# Patient Record
Sex: FEMALE | Race: WHITE | NOT HISPANIC OR LATINO | Employment: OTHER | URBAN - METROPOLITAN AREA
[De-identification: names, ages, dates, MRNs, and addresses within clinical notes are randomized per-mention and may not be internally consistent; named-entity substitution may affect disease eponyms.]

---

## 2020-09-19 ENCOUNTER — HOSPITAL ENCOUNTER (EMERGENCY)
Facility: HOSPITAL | Age: 85
Discharge: NURSING, REHAB OR LONG-TERM CARE | End: 2020-09-20
Attending: EMERGENCY MEDICINE | Admitting: NEUROLOGICAL SURGERY
Payer: MEDICARE

## 2020-09-19 DIAGNOSIS — M54.6 CHRONIC MIDLINE THORACIC BACK PAIN: ICD-10-CM

## 2020-09-19 DIAGNOSIS — M54.50 CHRONIC MIDLINE LOW BACK PAIN WITHOUT SCIATICA: ICD-10-CM

## 2020-09-19 DIAGNOSIS — S32.020A COMPRESSION FRACTURE OF L2 VERTEBRA, INITIAL ENCOUNTER: ICD-10-CM

## 2020-09-19 DIAGNOSIS — G89.29 CHRONIC MIDLINE THORACIC BACK PAIN: ICD-10-CM

## 2020-09-19 DIAGNOSIS — S09.90XA CLOSED HEAD INJURY: ICD-10-CM

## 2020-09-19 DIAGNOSIS — I60.9 SUBARACHNOID HEMORRHAGE: Primary | ICD-10-CM

## 2020-09-19 DIAGNOSIS — G89.29 CHRONIC MIDLINE LOW BACK PAIN WITHOUT SCIATICA: ICD-10-CM

## 2020-09-19 PROBLEM — S06.6X0A SUBARACHNOID HEMORRHAGE FOLLOWING INJURY, NO LOSS OF CONSCIOUSNESS: Status: ACTIVE | Noted: 2020-09-19

## 2020-09-19 LAB
ALBUMIN SERPL BCP-MCNC: 3.1 G/DL (ref 3.5–5.2)
ALP SERPL-CCNC: 71 U/L (ref 55–135)
ALT SERPL W/O P-5'-P-CCNC: 20 U/L (ref 10–44)
ANION GAP SERPL CALC-SCNC: 9 MMOL/L (ref 8–16)
APTT BLDCRRT: 24.7 SEC (ref 21–32)
AST SERPL-CCNC: 23 U/L (ref 10–40)
BASOPHILS # BLD AUTO: 0.06 K/UL (ref 0–0.2)
BASOPHILS NFR BLD: 0.5 % (ref 0–1.9)
BILIRUB SERPL-MCNC: 0.2 MG/DL (ref 0.1–1)
BUN SERPL-MCNC: 20 MG/DL (ref 10–30)
CALCIUM SERPL-MCNC: 8.7 MG/DL (ref 8.7–10.5)
CHLORIDE SERPL-SCNC: 104 MMOL/L (ref 95–110)
CO2 SERPL-SCNC: 28 MMOL/L (ref 23–29)
CREAT SERPL-MCNC: 0.8 MG/DL (ref 0.5–1.4)
DIFFERENTIAL METHOD: ABNORMAL
EOSINOPHIL # BLD AUTO: 0.8 K/UL (ref 0–0.5)
EOSINOPHIL NFR BLD: 6.3 % (ref 0–8)
ERYTHROCYTE [DISTWIDTH] IN BLOOD BY AUTOMATED COUNT: 13.9 % (ref 11.5–14.5)
EST. GFR  (AFRICAN AMERICAN): >60 ML/MIN/1.73 M^2
EST. GFR  (NON AFRICAN AMERICAN): >60 ML/MIN/1.73 M^2
GLUCOSE SERPL-MCNC: 71 MG/DL (ref 70–110)
HCT VFR BLD AUTO: 31.4 % (ref 37–48.5)
HGB BLD-MCNC: 9.9 G/DL (ref 12–16)
IMM GRANULOCYTES # BLD AUTO: 0.07 K/UL (ref 0–0.04)
IMM GRANULOCYTES NFR BLD AUTO: 0.6 % (ref 0–0.5)
INR PPP: 1 (ref 0.8–1.2)
LYMPHOCYTES # BLD AUTO: 1.6 K/UL (ref 1–4.8)
LYMPHOCYTES NFR BLD: 13.3 % (ref 18–48)
MCH RBC QN AUTO: 32.5 PG (ref 27–31)
MCHC RBC AUTO-ENTMCNC: 31.5 G/DL (ref 32–36)
MCV RBC AUTO: 103 FL (ref 82–98)
MONOCYTES # BLD AUTO: 0.7 K/UL (ref 0.3–1)
MONOCYTES NFR BLD: 5.6 % (ref 4–15)
NEUTROPHILS # BLD AUTO: 8.8 K/UL (ref 1.8–7.7)
NEUTROPHILS NFR BLD: 73.7 % (ref 38–73)
NRBC BLD-RTO: 0 /100 WBC
PLATELET # BLD AUTO: 260 K/UL (ref 150–350)
PMV BLD AUTO: 10.6 FL (ref 9.2–12.9)
POTASSIUM SERPL-SCNC: 4 MMOL/L (ref 3.5–5.1)
PROT SERPL-MCNC: 6.5 G/DL (ref 6–8.4)
PROTHROMBIN TIME: 10.6 SEC (ref 9–12.5)
RBC # BLD AUTO: 3.05 M/UL (ref 4–5.4)
SODIUM SERPL-SCNC: 141 MMOL/L (ref 136–145)
WBC # BLD AUTO: 11.96 K/UL (ref 3.9–12.7)

## 2020-09-19 PROCEDURE — 85730 THROMBOPLASTIN TIME PARTIAL: CPT

## 2020-09-19 PROCEDURE — 90471 IMMUNIZATION ADMIN: CPT | Performed by: EMERGENCY MEDICINE

## 2020-09-19 PROCEDURE — 99284 EMERGENCY DEPT VISIT MOD MDM: CPT | Mod: GC,,, | Performed by: NEUROLOGICAL SURGERY

## 2020-09-19 PROCEDURE — 63600175 PHARM REV CODE 636 W HCPCS: Performed by: EMERGENCY MEDICINE

## 2020-09-19 PROCEDURE — 12002 RPR S/N/AX/GEN/TRNK2.6-7.5CM: CPT

## 2020-09-19 PROCEDURE — 80053 COMPREHEN METABOLIC PANEL: CPT

## 2020-09-19 PROCEDURE — 99284 PR EMERGENCY DEPT VISIT,LEVEL IV: ICD-10-PCS | Mod: GC,,, | Performed by: NEUROLOGICAL SURGERY

## 2020-09-19 PROCEDURE — 85610 PROTHROMBIN TIME: CPT

## 2020-09-19 PROCEDURE — 99291 CRITICAL CARE FIRST HOUR: CPT | Mod: 25

## 2020-09-19 PROCEDURE — 90715 TDAP VACCINE 7 YRS/> IM: CPT | Performed by: EMERGENCY MEDICINE

## 2020-09-19 PROCEDURE — 85025 COMPLETE CBC W/AUTO DIFF WBC: CPT

## 2020-09-19 RX ADMIN — CLOSTRIDIUM TETANI TOXOID ANTIGEN (FORMALDEHYDE INACTIVATED), CORYNEBACTERIUM DIPHTHERIAE TOXOID ANTIGEN (FORMALDEHYDE INACTIVATED), BORDETELLA PERTUSSIS TOXOID ANTIGEN (GLUTARALDEHYDE INACTIVATED), BORDETELLA PERTUSSIS FILAMENTOUS HEMAGGLUTININ ANTIGEN (FORMALDEHYDE INACTIVATED), BORDETELLA PERTUSSIS PERTACTIN ANTIGEN, AND BORDETELLA PERTUSSIS FIMBRIAE 2/3 ANTIGEN 0.5 ML: 5; 2; 2.5; 5; 3; 5 INJECTION, SUSPENSION INTRAMUSCULAR at 11:09

## 2020-09-19 NOTE — ED NOTES
CT results showed Small foci of subarachnoid hemorrhage in the left frontal lobe as above.  No significant mass effect. Pt moved from room 15 into room 14 and hooked up to monitor. IVs x 2 placed. Pt still in C-collar. Dr Amaro at bedside for reevaluation.     Pt is currently awake and alert. Pt is forgetful at baseline.

## 2020-09-19 NOTE — ED NOTES
Dr Amaro at bedside stapling laceration on the back of head. 4 staples placed .  C-collar removed

## 2020-09-19 NOTE — ED PROVIDER NOTES
Encounter Date: 9/19/2020    SCRIBE #1 NOTE: I, Brenda Mariscal, am scribing for, and in the presence of,  Curly Amaro MD. I have scribed the following portions of the note - Other sections scribed: HPI, ROS.       History     Chief Complaint   Patient presents with    [place] [facility]     SAH after trip and fall , denies LOC. Lac to posterior head. Oriented to self only.      Cristina Taylor is a 89 year old female with PMHx of arthritis, bursitis, hyperlipidemia, hypercholesterolemia, thyroid disease, and glaucoma who presents to the ED via EMS for evaluation after a fall that occurred prior to arrival. Patent denies any loss of consciousness. Patient c/o neck pain but states it is due to her chronic back pain. Patient denies any hip injury or pain secondary to fall. Denies any new pain in back, arms, or legs secondary to fall. Denies fever, headache, eye problems, sore throat, chest pain, abdominal pain, shortness of breath, coughing, vomiting, diarrhea, or dysuria. PSHx includes cholecystectomy. Patient is allergic to aspirin, codeine, dicloxacillin, and Flexeril. No other associated symptoms.    The history is provided by the patient.     Review of patient's allergies indicates:   Allergen Reactions    Aspirin Hives    Codeine Hives    Dicloxacillin     Flexeril [cyclobenzaprine]      Past Medical History:   Diagnosis Date    Bursitis     Glaucoma     High cholesterol     Hyperlipemia     Thyroid disease      Past Surgical History:   Procedure Laterality Date    CHOLECYSTECTOMY       No family history on file.  Social History     Tobacco Use    Smoking status: Never Smoker    Smokeless tobacco: Never Used   Substance Use Topics    Alcohol use: Yes     Alcohol/week: 7.0 standard drinks     Types: 7 Glasses of wine per week    Drug use: No     Review of Systems   Constitutional: Negative for fever.   HENT: Negative for ear pain and sore throat.    Respiratory: Negative for cough and shortness of  breath.    Cardiovascular: Negative for chest pain.   Gastrointestinal: Negative for abdominal pain, diarrhea and vomiting.   Genitourinary: Negative for dysuria.   Musculoskeletal: Positive for neck pain. Negative for arthralgias and back pain.        Negative for bilateral arm or leg pain. Negative for hip pain.   Neurological: Negative for syncope and headaches.       Physical Exam     Initial Vitals [09/19/20 1542]   BP Pulse Resp Temp SpO2   (!) 97/51 68 18 97.7 °F (36.5 °C) 96 %      MAP       --         Physical Exam  The patient was examined specifically for the following:   General:No significant distress, Good color, Warm and dry. Head and neck:Scalp atraumatic, Neck supple. Neurological:Appropriate conversation, Gross motor deficits. Eyes:Conjugate gaze, Clear corneas. ENT: No epistaxis. Cardiac: Regular rate and rhythm, Grossly normal heart tones. Pulmonary: Wheezing, Rales. Gastrointestinal: Abdominal tenderness, Abdominal distention. Musculoskeletal: Extremity deformity, Apparent pain with range of motion of the joints. Skin: Rash.   The findings on examination were normal except for the following:  The patient has a scalp laceration on the right occipital scalp.  She is wearing a cervical collar.  The patient is awake alert and bright.  Cranial nerves, motor and sensory examination grossly unremarkable.  There is no significant pain with range of motion of the joints.  The patient is wearing a cervical collar.   ED Course   Critical Care    Date/Time: 9/19/2020 6:29 PM  Performed by: Curly Amaro MD  Authorized by: Curly Amaro MD   Total critical care time (exclusive of procedural time) : 0 minutes  Critical care time was exclusive of separately billable procedures and treating other patients and teaching time.  Critical care was necessary to treat or prevent imminent or life-threatening deterioration of the following conditions: CNS failure or compromise and trauma.  Critical care was time  spent personally by me on the following activities: development of treatment plan with patient or surrogate, evaluation of patient's response to treatment, examination of patient, obtaining history from patient or surrogate, ordering and performing treatments and interventions, ordering and review of laboratory studies, ordering and review of radiographic studies, pulse oximetry, re-evaluation of patient's condition and review of old charts.    Lac Repair    Date/Time: 9/19/2020 6:29 PM  Performed by: Curly Amaro MD  Authorized by: Curly Amaro MD   Body area: head/neck  Location details: scalp  Laceration length: 3 cm  Foreign bodies: no foreign bodies    Anesthesia:  Anesthetic total: 0 mL  Irrigation solution: saline  Amount of cleaning: standard  Debridement: none  Degree of undermining: none  Skin closure: staples  Approximation: close  Approximation difficulty: simple  Patient tolerance: Patient tolerated the procedure well with no immediate complications    Critical Care    Date/Time: 9/26/2020 9:24 AM  Performed by: Curly Amaro MD  Authorized by: Martin Contreras MD   Direct patient critical care time: 22 minutes  Additional history critical care time: 11 minutes  Ordering / reviewing critical care time: 11 minutes  Documentation critical care time: 11 minutes  Consulting other physicians critical care time: 4 minutes  Consult with family critical care time: 4 minutes  Total critical care time (exclusive of procedural time) : 63 minutes  Critical care time was exclusive of separately billable procedures and treating other patients and teaching time.  Critical care was necessary to treat or prevent imminent or life-threatening deterioration of the following conditions: CNS failure or compromise.  Critical care was time spent personally by me on the following activities: blood draw for specimens, development of treatment plan with patient or surrogate, discussions with consultants, evaluation of  patient's response to treatment, examination of patient, obtaining history from patient or surrogate, ordering and performing treatments and interventions, ordering and review of laboratory studies, ordering and review of radiographic studies, pulse oximetry, re-evaluation of patient's condition and review of old charts.        Labs Reviewed   COMPREHENSIVE METABOLIC PANEL - Abnormal; Notable for the following components:       Result Value    Albumin 3.1 (*)     All other components within normal limits   CBC W/ AUTO DIFFERENTIAL - Abnormal; Notable for the following components:    RBC 3.05 (*)     Hemoglobin 9.9 (*)     Hematocrit 31.4 (*)     Mean Corpuscular Volume 103 (*)     Mean Corpuscular Hemoglobin 32.5 (*)     Mean Corpuscular Hemoglobin Conc 31.5 (*)     Immature Granulocytes 0.6 (*)     Gran # (ANC) 8.8 (*)     Immature Grans (Abs) 0.07 (*)     Eos # 0.8 (*)     Gran% 73.7 (*)     Lymph% 13.3 (*)     All other components within normal limits   APTT   PROTIME-INR          Imaging Results          CT Head Without Contrast (Final result)  Result time 09/19/20 22:49:31    Final result by Isaias Daniel MD (09/19/20 22:49:31)                 Impression:      Stable small volume subarachnoid hemorrhage in the left frontal lobe.  No new or worsening intracranial hemorrhage.    Posterior scalp soft tissue injury.      Electronically signed by: Isaias Daniel MD  Date:    09/19/2020  Time:    22:49             Narrative:    EXAMINATION:  CT HEAD WITHOUT CONTRAST    CLINICAL HISTORY:  Stroke, follow up;    TECHNIQUE:  Low dose axial images were obtained through the head.  Coronal and sagittal reformations were also performed. Contrast was not administered.    COMPARISON:  CT head, 09/19/2020 at 16:31.    FINDINGS:  Generalized cerebral volume loss with ex vacuo dilation of the ventricles and sulci.  Moderate periventricular white matter hypoattenuation suggestive of chronic microvascular ischemic disease.   No evidence of acute territorial infarct.  There is stable trace subarachnoid hemorrhage in the left superolateral frontal lobe.  No new or worsening hemorrhage.  No significant mass effect.  No new midline shift.    No evidence of hydrocephalus.    Posterior scalp soft tissue injury with overlying skin staples.  No displaced calvarial fracture.    Visualized paranasal sinuses and mastoid air cells are clear.                               X-Ray Lumbar Spine Ap And Lateral (Final result)  Result time 09/19/20 19:12:39    Final result by Chago Keith MD (09/19/20 19:12:39)                 Impression:      Age-indeterminate compression fracture of the L2 vertebral body.  MRI may be attempted for further evaluation.    Chronic fracture of the L1 vertebral body with near planar appearance of L1 vertebral body.    Advanced degenerative changes in the lumbar spine.      Electronically signed by: Chago Keith MD  Date:    09/19/2020  Time:    19:12             Narrative:    EXAMINATION:  XR LUMBAR SPINE AP AND LATERAL    CLINICAL HISTORY:  Back pain or radiculopathy, trauma;    TECHNIQUE:  AP, lateral and spot images were performed of the lumbar spine.    COMPARISON:  01/13/2015 and 04/21/2016.    FINDINGS:  There is rightward curvature of the thoracolumbar alignment.  There is also straightening of normal lumbar lordosis.    There is a chronic fracture of the L1 vertebral body with near planar appearance of the L1 vertebral body.  There is an age-indeterminate fracture of the L2 vertebral body.  The remainder of the vertebral body heights are maintained.    There is significant hypertrophy of the posterior elements.  The transverse processes are intact.  There is intervertebral disc space narrowing throughout the lumbar spine.  There is joint space narrowing of the sacroiliac joints.    There are postoperative changes in the right upper abdominal quadrant.  There are advanced calcifications in the abdominal aorta.                                CT Head Without Contrast (Final result)  Result time 09/19/20 17:00:40    Final result by Chago Keith MD (09/19/20 17:00:40)                 Impression:      CT head:    Small foci of subarachnoid hemorrhage in the left frontal lobe as above.  No significant mass effect.    Subcutaneous soft tissue swelling in the parietal region.  No underlying calvarial fracture.    CT cervical spine.    Minimal anterolisthesis of C7 on T1.    No evidence of acute fracture of the cervical spine.    Case discussed with Dr. Amaro on 09/19/2020 at 16:59.      Electronically signed by: Chago Keith MD  Date:    09/19/2020  Time:    17:00             Narrative:    EXAMINATION:  CT HEAD WITHOUT CONTRAST; CT CERVICAL SPINE WITHOUT CONTRAST    CLINICAL HISTORY:  Head trauma, minor (Age => 65y);; Neck trauma (Age => 65y); Unspecified injury of head, initial encounter    TECHNIQUE:  Low dose axial images were obtained through the head and cervical spine.  Coronal and sagittal reformations were also performed. Contrast was not administered.    COMPARISON:  04/21/2016.    FINDINGS:  CT head:    There is subcutaneous soft tissue swelling in the high parietal regions.  The remaining subcutaneous tissues are unremarkable the bony calvarium is intact.  The paranasal sinuses are unremarkable.  The right mastoid air cells are clear.  There is trace left mastoid effusion.  There are postoperative changes in the orbits.    The craniocervical junction is within normal limits.  The midline structures are intact.  The ventricles and sulci are parenchyma suggestive of cerebral volume loss.  There are hypodensities within the periventricular and subcortical white matter.  The gray-white differentiation is maintained.  There are no extra-axial fluid collections.  There is small focus of subarachnoid blood in the left superior frontal gyrus.  Small additional foci of subarachnoid hemorrhage is identified within the left inferior  frontal sulcus.  There is no evidence of mass effect.    CT cervical spine:    There is minimal anterolisthesis of C7 on T1.  There is also leftward curvature of the cervical alignment.  The remainder of the cervical alignment is within normal limits    The vertebral body heights are maintained.  There is hypertrophy of the posterior elements.  The C1 ring is intact.  There is intervertebral disc space narrowing at multiple levels.  There is variable spinal canal and neural foraminal narrowing.  There is no evidence of acute fracture of the cervical spine.    There are calcifications of the neck vessels.  There is no evidence of lymphadenopathy in the neck.  The visualized lung apices are unremarkable.  There is no evidence of a pneumothorax or pulmonary contusion.                               CT Cervical Spine Without Contrast (Final result)  Result time 09/19/20 17:00:40    Final result by Chago Keith MD (09/19/20 17:00:40)                 Impression:      CT head:    Small foci of subarachnoid hemorrhage in the left frontal lobe as above.  No significant mass effect.    Subcutaneous soft tissue swelling in the parietal region.  No underlying calvarial fracture.    CT cervical spine.    Minimal anterolisthesis of C7 on T1.    No evidence of acute fracture of the cervical spine.    Case discussed with Dr. Amaro on 09/19/2020 at 16:59.      Electronically signed by: Chago Keith MD  Date:    09/19/2020  Time:    17:00             Narrative:    EXAMINATION:  CT HEAD WITHOUT CONTRAST; CT CERVICAL SPINE WITHOUT CONTRAST    CLINICAL HISTORY:  Head trauma, minor (Age => 65y);; Neck trauma (Age => 65y); Unspecified injury of head, initial encounter    TECHNIQUE:  Low dose axial images were obtained through the head and cervical spine.  Coronal and sagittal reformations were also performed. Contrast was not administered.    COMPARISON:  04/21/2016.    FINDINGS:  CT head:    There is subcutaneous soft tissue swelling in  the high parietal regions.  The remaining subcutaneous tissues are unremarkable the bony calvarium is intact.  The paranasal sinuses are unremarkable.  The right mastoid air cells are clear.  There is trace left mastoid effusion.  There are postoperative changes in the orbits.    The craniocervical junction is within normal limits.  The midline structures are intact.  The ventricles and sulci are parenchyma suggestive of cerebral volume loss.  There are hypodensities within the periventricular and subcortical white matter.  The gray-white differentiation is maintained.  There are no extra-axial fluid collections.  There is small focus of subarachnoid blood in the left superior frontal gyrus.  Small additional foci of subarachnoid hemorrhage is identified within the left inferior frontal sulcus.  There is no evidence of mass effect.    CT cervical spine:    There is minimal anterolisthesis of C7 on T1.  There is also leftward curvature of the cervical alignment.  The remainder of the cervical alignment is within normal limits    The vertebral body heights are maintained.  There is hypertrophy of the posterior elements.  The C1 ring is intact.  There is intervertebral disc space narrowing at multiple levels.  There is variable spinal canal and neural foraminal narrowing.  There is no evidence of acute fracture of the cervical spine.    There are calcifications of the neck vessels.  There is no evidence of lymphadenopathy in the neck.  The visualized lung apices are unremarkable.  There is no evidence of a pneumothorax or pulmonary contusion.                              Medical decision making:  Given the above this patient presents to the emergency room after a fall.  Patient hit her head in the phone presents with occipital scalp laceration was repair.  The patient is appropriate conversation.  She has no neck pain.  She reports that she has chronic thoracic back and lumbar pain that is not different since the fall.   She is without other injuries or problems.  CT of the head reveals a small subarachnoid hemorrhage.  The patient will be transferred to [facility] for further evaluation of her closed head injury.  Neurosurgery accepts the patient.   This patient also has a vertebral body compression fracture at L2.                 Scribe Attestation:   Scribe #1: I performed the above scribed service and the documentation accurately describes the services I performed. I attest to the accuracy of the note.                      Clinical Impression:     ICD-10-CM ICD-9-CM   1. Subarachnoid hemorrhage  I60.9 430   2. Closed head injury  S09.90XA 959.01   3. Chronic midline thoracic back pain  M54.6 724.1    G89.29 338.29   4. Chronic midline low back pain without sciatica  M54.5 724.2    G89.29 338.29   5. Compression fracture of L2 vertebra, initial encounter  S32.020A 805.4                          ED Disposition Condition    Transfer to Another Facility Stable           I personally performed the services described in this documentation.  All medical record  entries made by the scribe are at my direction and in my presence.  Signed, Dr. Sondra Amaro MD  09/22/20 0906       Curly Amaro MD  09/26/20 0925       Curly Amaro MD  09/28/20 1544       Curly Amaro MD  09/28/20 1605       Curly Amaro MD  09/28/20 1610       Curly Amaro MD  10/03/20 1123

## 2020-09-20 VITALS
DIASTOLIC BLOOD PRESSURE: 58 MMHG | RESPIRATION RATE: 19 BRPM | OXYGEN SATURATION: 96 % | TEMPERATURE: 98 F | HEART RATE: 88 BPM | HEIGHT: 60 IN | WEIGHT: 105 LBS | SYSTOLIC BLOOD PRESSURE: 111 MMHG | BODY MASS INDEX: 20.62 KG/M2

## 2020-09-20 NOTE — CARE UPDATE
Repeat CTH stable. No new hemorrhage. Ok for DC from NSGY perspective.     NSGY will sign off, please call w/questions.     Julianna León MD  Neurosurgery  [facility]

## 2020-09-20 NOTE — PROVIDER PROGRESS NOTES - EMERGENCY DEPT.
Encounter Date: 9/19/2020    ED Physician Progress Notes             On my exam, the patient is well appearing, I assumed care of this patient at change of shift from Dr. Contreras. Briefly, this is a 89 y.o. female who presented status post a fall.  She was diagnosed with a subarachnoid hemorrhage at an outside hospital and transferred here for neurosurgical evaluation.  She was seen and evaluated by Neurosurgery, and they recommended a repeat head CT given her clinical stability.  This has been done, and by my independent interpretation, it is clinically stable, so I think she can be discharged back to her nursing facility.  I discussed this with the patient's daughter, who is agreeable with plan, and the patient was transferred back to her nursing facility.    Workup Reviewed:     Labs Reviewed   COMPREHENSIVE METABOLIC PANEL - Abnormal; Notable for the following components:       Result Value    Albumin 3.1 (*)     All other components within normal limits   CBC W/ AUTO DIFFERENTIAL - Abnormal; Notable for the following components:    RBC 3.05 (*)     Hemoglobin 9.9 (*)     Hematocrit 31.4 (*)     Mean Corpuscular Volume 103 (*)     Mean Corpuscular Hemoglobin 32.5 (*)     Mean Corpuscular Hemoglobin Conc 31.5 (*)     Immature Granulocytes 0.6 (*)     Gran # (ANC) 8.8 (*)     Immature Grans (Abs) 0.07 (*)     Eos # 0.8 (*)     Gran% 73.7 (*)     Lymph% 13.3 (*)     All other components within normal limits   APTT   PROTIME-INR   SARS-COV-2 RDRP GENE     CT Head Without Contrast   Final Result      Stable small volume subarachnoid hemorrhage in the left frontal lobe.  No new or worsening intracranial hemorrhage.      Posterior scalp soft tissue injury.         Electronically signed by: Isaias Daniel MD   Date:    09/19/2020   Time:    22:49      X-Ray Lumbar Spine Ap And Lateral   Final Result      Age-indeterminate compression fracture of the L2 vertebral body.  MRI may be attempted for further evaluation.       Chronic fracture of the L1 vertebral body with near planar appearance of L1 vertebral body.      Advanced degenerative changes in the lumbar spine.         Electronically signed by: Chago Keith MD   Date:    09/19/2020   Time:    19:12      CT Head Without Contrast   Final Result      CT head:      Small foci of subarachnoid hemorrhage in the left frontal lobe as above.  No significant mass effect.      Subcutaneous soft tissue swelling in the parietal region.  No underlying calvarial fracture.      CT cervical spine.      Minimal anterolisthesis of C7 on T1.      No evidence of acute fracture of the cervical spine.      Case discussed with Dr. Amaro on 09/19/2020 at 16:59.         Electronically signed by: Chago Keith MD   Date:    09/19/2020   Time:    17:00      CT Cervical Spine Without Contrast   Final Result      CT head:      Small foci of subarachnoid hemorrhage in the left frontal lobe as above.  No significant mass effect.      Subcutaneous soft tissue swelling in the parietal region.  No underlying calvarial fracture.      CT cervical spine.      Minimal anterolisthesis of C7 on T1.      No evidence of acute fracture of the cervical spine.      Case discussed with Dr. Amaro on 09/19/2020 at 16:59.         Electronically signed by: Chago Keith MD   Date:    09/19/2020   Time:    17:00          Final diagnoses:  [S09.90XA] Closed head injury  [I60.9] Subarachnoid hemorrhage (Primary)  [M54.6, G89.29] Chronic midline thoracic back pain  [M54.5, G89.29] Chronic midline low back pain without sciatica  [S32.020A] Compression fracture of L2 vertebra, initial encounter

## 2020-09-20 NOTE — ED NOTES
Rec'd report from FAHAD Maurer RN. Pt is lying supine in bed, awake and alert. Pt is happy and talkative, baseline per Watson Landis RN. No respiratory distress observed. Skin is warm, dry and pink. VSS. Pt is connected to the pulse ox, B/P cuff and EKG monitor. Bed is locked and in the low position w/ the side rails up and locked for pt safety. Call bell @ the BS. Will continue to monitor closely.

## 2020-09-20 NOTE — CONSULTS
"[facility]  Neurosurgery  Consult Note    Consults  Subjective:     Chief Complaint/Reason for Admission: Fall    History of Present Illness: 89 F presents s/p fall, transfer from [facility] for small volume L frontal tSAH.  She had a laceration to the posterior skull that was repaired at the [facility]. Patient has baseline dementia, is unable to provide history. History taken from medical record/ED report. Patient denies complaints at this time, states she had some pain from her pelvis into her legs that has resolved. Denies neck pain or headache. States she has no vision difficulties thanks to "playing tic tac toe." Patient is unable to provide details surrounding fall. Cannot state if she had syncopal symptoms prior to event. Cannot state where she lives or who she lives with but state she is "well taken care of."    No ASA/AC in med rec.     (Not in a hospital admission)      Review of patient's allergies indicates:   Allergen Reactions    Aspirin Hives    Codeine Hives    Dicloxacillin     Flexeril [cyclobenzaprine]        Past Medical History:   Diagnosis Date    Bursitis     Glaucoma     High cholesterol     Hyperlipemia     Thyroid disease      Past Surgical History:   Procedure Laterality Date    CHOLECYSTECTOMY       Family History     None        Tobacco Use    Smoking status: Never Smoker    Smokeless tobacco: Never Used   Substance and Sexual Activity    Alcohol use: Yes     Alcohol/week: 7.0 standard drinks     Types: 7 Glasses of wine per week    Drug use: No    Sexual activity: Not on file     Review of Systems   Constitutional: Negative for chills and fever.   HENT: Positive for hearing loss.    Eyes: Negative for photophobia.   Respiratory: Negative for shortness of breath.    Cardiovascular: Negative for chest pain.   Gastrointestinal: Negative for nausea and vomiting.   Genitourinary: Negative for difficulty urinating.   Musculoskeletal: Positive for back " pain. Negative for neck pain.   Neurological: Negative for headaches.     Objective:     Weight: 47.6 kg (105 lb)  Body mass index is 20.51 kg/m².  Vital Signs (Most Recent):  Temp: 98.4 °F (36.9 °C) (09/19/20 2024)  Pulse: 73 (09/19/20 2024)  Resp: 19 (09/19/20 2024)  BP: 113/73 (09/19/20 2024)  SpO2: 97 % (09/19/20 2024) Vital Signs (24h Range):  Temp:  [97.7 °F (36.5 °C)-98.4 °F (36.9 °C)] 98.4 °F (36.9 °C)  Pulse:  [67-75] 73  Resp:  [18-25] 19  SpO2:  [95 %-97 %] 97 %  BP: ()/(51-73) 113/73                          Neurosurgery Physical Exam  Awake, alert, oriented to self  PERRL, EOMI  Extremely hard of hearing  Difficulty following train of thought  DESAI spontaneously, symmetrically  FCx4  Cannot participate in strength exam    Significant Labs:  Recent Labs   Lab 09/19/20 1836   GLU 71      K 4.0      CO2 28   BUN 20   CREATININE 0.8   CALCIUM 8.7     Recent Labs   Lab 09/19/20 1836   WBC 11.96   HGB 9.9*   HCT 31.4*        Recent Labs   Lab 09/19/20 1836   INR 1.0   APTT 24.7     Significant Diagnostics:  X-ray Lumbar Spine Ap And Lateral    Result Date: 9/19/2020  Age-indeterminate compression fracture of the L2 vertebral body.  MRI may be attempted for further evaluation. Chronic fracture of the L1 vertebral body with near planar appearance of L1 vertebral body. Advanced degenerative changes in the lumbar spine. Electronically signed by: Chago Keith MD Date:    09/19/2020 Time:    19:12    Ct Head Without Contrast    Result Date: 9/19/2020  CT head: Small foci of subarachnoid hemorrhage in the left frontal lobe as above.  No significant mass effect. Subcutaneous soft tissue swelling in the parietal region.  No underlying calvarial fracture. CT cervical spine. Minimal anterolisthesis of C7 on T1. No evidence of acute fracture of the cervical spine. Case discussed with Dr. Amaro on 09/19/2020 at 16:59. Electronically signed by: Chago Keith MD Date:    09/19/2020  Time:    17:00    Ct Cervical Spine Without Contrast    Result Date: 9/19/2020  CT head: Small foci of subarachnoid hemorrhage in the left frontal lobe as above.  No significant mass effect. Subcutaneous soft tissue swelling in the parietal region.  No underlying calvarial fracture. CT cervical spine. Minimal anterolisthesis of C7 on T1. No evidence of acute fracture of the cervical spine. Case discussed with Dr. Amaro on 09/19/2020 at 16:59. Electronically signed by: Chago Keith MD Date:    09/19/2020 Time:    17:00      Assessment/Plan:     Subarachnoid hemorrhage following injury, no loss of consciousness  89 F with dementia, unknown baseline, HTN, Hypothyroid presenting s/p fall with small volume L frontal tSAH, no LOC, no seizure activity:    Remainder of trauma work up per ED  CTH with small volume tSAH w/o mass effect  No ASA/AC  Repeat CTH pending  CT C spine w/o acute fracture. Lumbar XR with indeterminate age L2 compression fx - patient w/o back pain at this time  Can consider LSO for comfort if patient desires  Rec therapy eval prior to DC if patient does live alone  Stable for DC from NSGY perspective if CTH is negative. May follow up PRN  Dispo per ED    D/w Dr. Maya. Will f/up repeat CTH.         Thank you for your consult. I will follow-up with patient. Please contact us if you have any additional questions.    Julianna Moran MD  Neurosurgery  [facility]

## 2020-09-20 NOTE — ED NOTES
Pt arrived via EMS from [facility] , alert, oriented to self, confused to place, time and situation, breathing equal unlabored, NAD, free of complaints, discomfort noted with movement.

## 2020-09-20 NOTE — ED PROVIDER NOTES
Source of History:  [facility]- [facility] record    [facility] (SAH after trip and fall , denies LOC. Lac to posterior head. Oriented to self only. )      HPI:  Cristina Taylor is a 89 y.o. female presenting with small left frontal subarachnoid hemorrhage that occurred after a fall earlier today.  She was seen at [facility] and a CT showed the subarachnoid.  She was transferred here for neurosurgery evaluation.  She has no complaints at this time.  She had a laceration to the posterior skull that was repaired at the [facility].    ROS: As per HPI and below:  General: No fever.  No chills.  Eyes: No visual changes.  Head: No headache.    Integument: No rashes or lesions.  Chest: No shortness of breath.  Cardiovascular: No chest pain.  Abdomen: No abdominal pain.  No nausea or vomiting.  Urinary: No abnormal urination.  Neurologic: No focal weakness.  No numbness.  Hematologic: No easy bruising.  Endocrine: No excessive thirst or urination.      Review of patient's allergies indicates:   Allergen Reactions    Aspirin Hives    Codeine Hives    Dicloxacillin     Flexeril [cyclobenzaprine]        No current facility-administered medications on file prior to encounter.      Current Outpatient Medications on File Prior to Encounter   Medication Sig Dispense Refill    acetaminophen (TYLENOL) 325 MG tablet Take 2 tablets (650 mg total) by mouth every 6 (six) hours as needed (Pain or T>100.4).  0    albuterol-ipratropium 2.5mg-0.5mg/3mL (DUO-NEB) 0.5 mg-3 mg(2.5 mg base)/3 mL nebulizer solution Take 3 mLs by nebulization every 6 (six) hours as needed for Wheezing or Shortness of Breath.  0    calcium-vitamin D3 (CALCIUM 500 + D) 500 mg(1,250mg) -200 unit per tablet Take 1 tablet by mouth 2 (two) times daily with meals.      famotidine (PEPCID) 20 MG tablet Take 1 tablet (20 mg total) by mouth once daily. 30 tablet 11    levobunolol (BETAGAN) 0.5 % ophthalmic solution 1 drop every  evening.      levothyroxine (SYNTHROID) 75 MCG tablet Take 75 mcg by mouth once daily.      metoprolol tartrate (LOPRESSOR) 25 MG tablet Take 0.5 tablets (12.5 mg total) by mouth 2 (two) times daily. 30 tablet 11       PMH:  As per HPI and below:  Past Medical History:   Diagnosis Date    Bursitis     Glaucoma     High cholesterol     Hyperlipemia     Thyroid disease      Past Surgical History:   Procedure Laterality Date    CHOLECYSTECTOMY         Social History     Socioeconomic History    Marital status:      Spouse name: Not on file    Number of children: Not on file    Years of education: Not on file    Highest education level: Not on file   Occupational History    Not on file   Social Needs    Financial resource strain: Not on file    Food insecurity     Worry: Not on file     Inability: Not on file    Transportation needs     Medical: Not on file     Non-medical: Not on file   Tobacco Use    Smoking status: Never Smoker    Smokeless tobacco: Never Used   Substance and Sexual Activity    Alcohol use: Yes     Alcohol/week: 7.0 standard drinks     Types: 7 Glasses of wine per week    Drug use: No    Sexual activity: Not on file   Lifestyle    Physical activity     Days per week: Not on file     Minutes per session: Not on file    Stress: Not on file   Relationships    Social connections     Talks on phone: Not on file     Gets together: Not on file     Attends Oriental orthodox service: Not on file     Active member of club or organization: Not on file     Attends meetings of clubs or organizations: Not on file     Relationship status: Not on file   Other Topics Concern    Not on file   Social History Narrative    Not on file       No family history on file.    Physical Exam:    Vitals:    09/19/20 2202 09/19/20 2219 09/19/20 2308 09/20/20 0002   BP: 113/70  (!) 97/52 (!) 111/58   BP Location:       Patient Position:       Pulse: 96 84 79 88   Resp:       Temp:       TempSrc:       SpO2:  96% 97% 95% 96%   Weight:       Height:         Appearance: No acute distress.  Skin: No rashes seen.  Good turgor.  No abrasions.  No ecchymoses.  Repaired scalp laceration.  Eyes: No conjunctival injection.  ENT: Oropharynx clear.    Chest: Clear to auscultation bilaterally.  Good air movement.  No wheezes.  No rhonchi.  Cardiovascular: Regular rate and rhythm.  No murmurs. No gallops. No rubs.  Abdomen: Soft.  Not distended.  Nontender.  No guarding.  No rebound.  Musculoskeletal: Good range of motion all joints.  No deformities.  Neck supple.  No meningismus.  Neurologic: Motor intact.  Sensation intact. Cranial nerves intact.  Mental Status:  Alert, conversant.  .      Initial Impression:  Small frontal subarachnoid hemorrhage on CT.  Discussed with neurosurgery.  They would like a repeat CT and state the patient will likely be discharged if her repeat CT has no changes.    Laboratory Studies:  Labs Reviewed   COMPREHENSIVE METABOLIC PANEL - Abnormal; Notable for the following components:       Result Value    Albumin 3.1 (*)     All other components within normal limits   CBC W/ AUTO DIFFERENTIAL - Abnormal; Notable for the following components:    RBC 3.05 (*)     Hemoglobin 9.9 (*)     Hematocrit 31.4 (*)     Mean Corpuscular Volume 103 (*)     Mean Corpuscular Hemoglobin 32.5 (*)     Mean Corpuscular Hemoglobin Conc 31.5 (*)     Immature Granulocytes 0.6 (*)     Gran # (ANC) 8.8 (*)     Immature Grans (Abs) 0.07 (*)     Eos # 0.8 (*)     Gran% 73.7 (*)     Lymph% 13.3 (*)     All other components within normal limits   APTT   PROTIME-INR       I decided to obtain the patient's medical records.    Imaging Results          CT Head Without Contrast (Final result)  Result time 09/19/20 22:49:31    Final result by Isaias Daniel MD (09/19/20 22:49:31)                 Impression:      Stable small volume subarachnoid hemorrhage in the left frontal lobe.  No new or worsening intracranial  hemorrhage.    Posterior scalp soft tissue injury.      Electronically signed by: Isaias Daniel MD  Date:    09/19/2020  Time:    22:49             Narrative:    EXAMINATION:  CT HEAD WITHOUT CONTRAST    CLINICAL HISTORY:  Stroke, follow up;    TECHNIQUE:  Low dose axial images were obtained through the head.  Coronal and sagittal reformations were also performed. Contrast was not administered.    COMPARISON:  CT head, 09/19/2020 at 16:31.    FINDINGS:  Generalized cerebral volume loss with ex vacuo dilation of the ventricles and sulci.  Moderate periventricular white matter hypoattenuation suggestive of chronic microvascular ischemic disease.  No evidence of acute territorial infarct.  There is stable trace subarachnoid hemorrhage in the left superolateral frontal lobe.  No new or worsening hemorrhage.  No significant mass effect.  No new midline shift.    No evidence of hydrocephalus.    Posterior scalp soft tissue injury with overlying skin staples.  No displaced calvarial fracture.    Visualized paranasal sinuses and mastoid air cells are clear.                               X-Ray Lumbar Spine Ap And Lateral (Final result)  Result time 09/19/20 19:12:39    Final result by Chago Keith MD (09/19/20 19:12:39)                 Impression:      Age-indeterminate compression fracture of the L2 vertebral body.  MRI may be attempted for further evaluation.    Chronic fracture of the L1 vertebral body with near planar appearance of L1 vertebral body.    Advanced degenerative changes in the lumbar spine.      Electronically signed by: Chago Keith MD  Date:    09/19/2020  Time:    19:12             Narrative:    EXAMINATION:  XR LUMBAR SPINE AP AND LATERAL    CLINICAL HISTORY:  Back pain or radiculopathy, trauma;    TECHNIQUE:  AP, lateral and spot images were performed of the lumbar spine.    COMPARISON:  01/13/2015 and 04/21/2016.    FINDINGS:  There is rightward curvature of the thoracolumbar alignment.  There is  also straightening of normal lumbar lordosis.    There is a chronic fracture of the L1 vertebral body with near planar appearance of the L1 vertebral body.  There is an age-indeterminate fracture of the L2 vertebral body.  The remainder of the vertebral body heights are maintained.    There is significant hypertrophy of the posterior elements.  The transverse processes are intact.  There is intervertebral disc space narrowing throughout the lumbar spine.  There is joint space narrowing of the sacroiliac joints.    There are postoperative changes in the right upper abdominal quadrant.  There are advanced calcifications in the abdominal aorta.                               CT Head Without Contrast (Final result)  Result time 09/19/20 17:00:40    Final result by Chago Keith MD (09/19/20 17:00:40)                 Impression:      CT head:    Small foci of subarachnoid hemorrhage in the left frontal lobe as above.  No significant mass effect.    Subcutaneous soft tissue swelling in the parietal region.  No underlying calvarial fracture.    CT cervical spine.    Minimal anterolisthesis of C7 on T1.    No evidence of acute fracture of the cervical spine.    Case discussed with Dr. Amaro on 09/19/2020 at 16:59.      Electronically signed by: Chago Keith MD  Date:    09/19/2020  Time:    17:00             Narrative:    EXAMINATION:  CT HEAD WITHOUT CONTRAST; CT CERVICAL SPINE WITHOUT CONTRAST    CLINICAL HISTORY:  Head trauma, minor (Age => 65y);; Neck trauma (Age => 65y); Unspecified injury of head, initial encounter    TECHNIQUE:  Low dose axial images were obtained through the head and cervical spine.  Coronal and sagittal reformations were also performed. Contrast was not administered.    COMPARISON:  04/21/2016.    FINDINGS:  CT head:    There is subcutaneous soft tissue swelling in the high parietal regions.  The remaining subcutaneous tissues are unremarkable the bony calvarium is intact.  The paranasal sinuses  are unremarkable.  The right mastoid air cells are clear.  There is trace left mastoid effusion.  There are postoperative changes in the orbits.    The craniocervical junction is within normal limits.  The midline structures are intact.  The ventricles and sulci are parenchyma suggestive of cerebral volume loss.  There are hypodensities within the periventricular and subcortical white matter.  The gray-white differentiation is maintained.  There are no extra-axial fluid collections.  There is small focus of subarachnoid blood in the left superior frontal gyrus.  Small additional foci of subarachnoid hemorrhage is identified within the left inferior frontal sulcus.  There is no evidence of mass effect.    CT cervical spine:    There is minimal anterolisthesis of C7 on T1.  There is also leftward curvature of the cervical alignment.  The remainder of the cervical alignment is within normal limits    The vertebral body heights are maintained.  There is hypertrophy of the posterior elements.  The C1 ring is intact.  There is intervertebral disc space narrowing at multiple levels.  There is variable spinal canal and neural foraminal narrowing.  There is no evidence of acute fracture of the cervical spine.    There are calcifications of the neck vessels.  There is no evidence of lymphadenopathy in the neck.  The visualized lung apices are unremarkable.  There is no evidence of a pneumothorax or pulmonary contusion.                               CT Cervical Spine Without Contrast (Final result)  Result time 09/19/20 17:00:40    Final result by Chago Keith MD (09/19/20 17:00:40)                 Impression:      CT head:    Small foci of subarachnoid hemorrhage in the left frontal lobe as above.  No significant mass effect.    Subcutaneous soft tissue swelling in the parietal region.  No underlying calvarial fracture.    CT cervical spine.    Minimal anterolisthesis of C7 on T1.    No evidence of acute fracture of the  cervical spine.    Case discussed with Dr. Amaro on 09/19/2020 at 16:59.      Electronically signed by: Chago Keith MD  Date:    09/19/2020  Time:    17:00             Narrative:    EXAMINATION:  CT HEAD WITHOUT CONTRAST; CT CERVICAL SPINE WITHOUT CONTRAST    CLINICAL HISTORY:  Head trauma, minor (Age => 65y);; Neck trauma (Age => 65y); Unspecified injury of head, initial encounter    TECHNIQUE:  Low dose axial images were obtained through the head and cervical spine.  Coronal and sagittal reformations were also performed. Contrast was not administered.    COMPARISON:  04/21/2016.    FINDINGS:  CT head:    There is subcutaneous soft tissue swelling in the high parietal regions.  The remaining subcutaneous tissues are unremarkable the bony calvarium is intact.  The paranasal sinuses are unremarkable.  The right mastoid air cells are clear.  There is trace left mastoid effusion.  There are postoperative changes in the orbits.    The craniocervical junction is within normal limits.  The midline structures are intact.  The ventricles and sulci are parenchyma suggestive of cerebral volume loss.  There are hypodensities within the periventricular and subcortical white matter.  The gray-white differentiation is maintained.  There are no extra-axial fluid collections.  There is small focus of subarachnoid blood in the left superior frontal gyrus.  Small additional foci of subarachnoid hemorrhage is identified within the left inferior frontal sulcus.  There is no evidence of mass effect.    CT cervical spine:    There is minimal anterolisthesis of C7 on T1.  There is also leftward curvature of the cervical alignment.  The remainder of the cervical alignment is within normal limits    The vertebral body heights are maintained.  There is hypertrophy of the posterior elements.  The C1 ring is intact.  There is intervertebral disc space narrowing at multiple levels.  There is variable spinal canal and neural foraminal  narrowing.  There is no evidence of acute fracture of the cervical spine.    There are calcifications of the neck vessels.  There is no evidence of lymphadenopathy in the neck.  The visualized lung apices are unremarkable.  There is no evidence of a pneumothorax or pulmonary contusion.                                Medications Given:  Medications   Tdap vaccine injection 0.5 mL (0.5 mLs Intramuscular Given 9/19/20 9409)     Critical Care:  Date: 9/19/20  Performed by: Martin Contreras MD  Authorized by: Martin Contreras MD   Total critical care time (exclusive of procedural time) : 30 minutes  Critical care was necessary to treat or prevent imminent or life-threatening deterioration of the following conditions:  SAH      MDM:    89 y.o. female with small subarachnoid on outside CT.  Transferred here for Neurosurgery evaluation.  Benign exam in the ED. Neurosurgery recommended repeat CT and discharge if negative.  Repeat was negative, okay to discharge.    Diagnostic Impression:    1. Subarachnoid hemorrhage    2. Closed head injury    3. Chronic midline thoracic back pain    4. Chronic midline low back pain without sciatica    5. Compression fracture of L2 vertebra, initial encounter         ED Disposition Condition    Discharge Stable        ED Prescriptions     None        Follow-up Information     Follow up With Specialties Details Why Contact Info    Diego Marks MD Internal Medicine Call   [address]  [address]  [facility] [address]  [phone]                           Martin Contreras MD  09/20/20 1654       Martin Contreras MD  09/30/20 0126

## 2020-09-20 NOTE — SUBJECTIVE & OBJECTIVE
(Not in a hospital admission)      Review of patient's allergies indicates:   Allergen Reactions    Aspirin Hives    Codeine Hives    Dicloxacillin     Flexeril [cyclobenzaprine]        Past Medical History:   Diagnosis Date    Bursitis     Glaucoma     High cholesterol     Hyperlipemia     Thyroid disease      Past Surgical History:   Procedure Laterality Date    CHOLECYSTECTOMY       Family History     None        Tobacco Use    Smoking status: Never Smoker    Smokeless tobacco: Never Used   Substance and Sexual Activity    Alcohol use: Yes     Alcohol/week: 7.0 standard drinks     Types: 7 Glasses of wine per week    Drug use: No    Sexual activity: Not on file     Review of Systems   Constitutional: Negative for chills and fever.   HENT: Positive for hearing loss.    Eyes: Negative for photophobia.   Respiratory: Negative for shortness of breath.    Cardiovascular: Negative for chest pain.   Gastrointestinal: Negative for nausea and vomiting.   Genitourinary: Negative for difficulty urinating.   Musculoskeletal: Positive for back pain. Negative for neck pain.   Neurological: Negative for headaches.     Objective:     Weight: 47.6 kg (105 lb)  Body mass index is 20.51 kg/m².  Vital Signs (Most Recent):  Temp: 98.4 °F (36.9 °C) (09/19/20 2024)  Pulse: 73 (09/19/20 2024)  Resp: 19 (09/19/20 2024)  BP: 113/73 (09/19/20 2024)  SpO2: 97 % (09/19/20 2024) Vital Signs (24h Range):  Temp:  [97.7 °F (36.5 °C)-98.4 °F (36.9 °C)] 98.4 °F (36.9 °C)  Pulse:  [67-75] 73  Resp:  [18-25] 19  SpO2:  [95 %-97 %] 97 %  BP: ()/(51-73) 113/73                          Neurosurgery Physical Exam  Awake, alert, oriented to self  PERRL, EOMI  Extremely hard of hearing  Difficulty following train of thought  DESAI spontaneously, symmetrically  FCx4  Cannot participate in strength exam    Significant Labs:  Recent Labs   Lab 09/19/20  1836   GLU 71      K 4.0      CO2 28   BUN 20   CREATININE 0.8   CALCIUM  8.7     Recent Labs   Lab 09/19/20  1836   WBC 11.96   HGB 9.9*   HCT 31.4*        Recent Labs   Lab 09/19/20  1836   INR 1.0   APTT 24.7     Significant Diagnostics:  X-ray Lumbar Spine Ap And Lateral    Result Date: 9/19/2020  Age-indeterminate compression fracture of the L2 vertebral body.  MRI may be attempted for further evaluation. Chronic fracture of the L1 vertebral body with near planar appearance of L1 vertebral body. Advanced degenerative changes in the lumbar spine. Electronically signed by: Chago Keith MD Date:    09/19/2020 Time:    19:12    Ct Head Without Contrast    Result Date: 9/19/2020  CT head: Small foci of subarachnoid hemorrhage in the left frontal lobe as above.  No significant mass effect. Subcutaneous soft tissue swelling in the parietal region.  No underlying calvarial fracture. CT cervical spine. Minimal anterolisthesis of C7 on T1. No evidence of acute fracture of the cervical spine. Case discussed with Dr. Amaro on 09/19/2020 at 16:59. Electronically signed by: Chago Keith MD Date:    09/19/2020 Time:    17:00    Ct Cervical Spine Without Contrast    Result Date: 9/19/2020  CT head: Small foci of subarachnoid hemorrhage in the left frontal lobe as above.  No significant mass effect. Subcutaneous soft tissue swelling in the parietal region.  No underlying calvarial fracture. CT cervical spine. Minimal anterolisthesis of C7 on T1. No evidence of acute fracture of the cervical spine. Case discussed with Dr. Amaro on 09/19/2020 at 16:59. Electronically signed by: Chago Keith MD Date:    09/19/2020 Time:    17:00

## 2020-09-20 NOTE — ASSESSMENT & PLAN NOTE
89 F with dementia, unknown baseline, HTN, Hypothyroid presenting s/p fall with small volume L frontal tSAH, no LOC, no seizure activity:    Remainder of trauma work up per ED  CTH with small volume tSAH w/o mass effect  No ASA/AC  Repeat CTH pending  CT C spine w/o acute fracture. Lumbar XR with indeterminate age L2 compression fx - patient w/o back pain at this time  Can consider LSO for comfort if patient desires  Rec therapy eval prior to DC if patient does live alone  Stable for DC from NSGY perspective if CTH is negative. May follow up PRN  Dispo per ED    D/w Dr. Maya. Will f/up repeat CTH.

## 2020-09-20 NOTE — HPI
"89 F presents s/p fall, transfer from [facility] for small volume L frontal tSAH.  She had a laceration to the posterior skull that was repaired at the [facility]. Patient has baseline dementia, is unable to provide history. History taken from medical record/ED report. Patient denies complaints at this time, states she had some pain from her pelvis into her legs that has resolved. Denies neck pain or headache. States she has no vision difficulties thanks to "playing tic tac toe." Patient is unable to provide details surrounding fall. Cannot state if she had syncopal symptoms prior to event. Cannot state where she lives or who she lives with but state she is "well taken care of."    No ASA/AC in med rec.   "

## 2023-02-10 ENCOUNTER — HOSPITAL ENCOUNTER (EMERGENCY)
Facility: HOSPITAL | Age: 88
End: 2023-02-10
Attending: STUDENT IN AN ORGANIZED HEALTH CARE EDUCATION/TRAINING PROGRAM
Payer: MEDICARE

## 2023-02-10 VITALS
WEIGHT: 105 LBS | SYSTOLIC BLOOD PRESSURE: 143 MMHG | HEIGHT: 64 IN | TEMPERATURE: 98 F | BODY MASS INDEX: 17.93 KG/M2 | HEART RATE: 75 BPM | DIASTOLIC BLOOD PRESSURE: 70 MMHG | RESPIRATION RATE: 18 BRPM | OXYGEN SATURATION: 96 %

## 2023-02-10 DIAGNOSIS — S06.6XAA: ICD-10-CM

## 2023-02-10 DIAGNOSIS — N30.90 CYSTITIS: ICD-10-CM

## 2023-02-10 DIAGNOSIS — I60.9 SAH (SUBARACHNOID HEMORRHAGE): Primary | ICD-10-CM
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
## 2023-02-10 LAB
ALBUMIN SERPL BCP-MCNC: 3.8 G/DL (ref 3.5–5.2)
ALP SERPL-CCNC: 61 U/L (ref 55–135)
ALT SERPL W/O P-5'-P-CCNC: 14 U/L (ref 10–44)
AMORPH CRY URNS QL MICRO: ABNORMAL
ANION GAP SERPL CALC-SCNC: 11 MMOL/L (ref 8–16)
APTT BLDCRRT: 24.6 SEC (ref 21–32)
AST SERPL-CCNC: 24 U/L (ref 10–40)
BACTERIA #/AREA URNS HPF: ABNORMAL /HPF
BASOPHILS # BLD AUTO: 0.05 K/UL (ref 0–0.2)
BASOPHILS NFR BLD: 0.6 % (ref 0–1.9)
BILIRUB SERPL-MCNC: 0.6 MG/DL (ref 0.1–1)
BILIRUB UR QL STRIP: NEGATIVE
BUN SERPL-MCNC: 17 MG/DL (ref 10–30)
CALCIUM SERPL-MCNC: 9 MG/DL (ref 8.7–10.5)
CHLORIDE SERPL-SCNC: 105 MMOL/L (ref 95–110)
CLARITY UR: ABNORMAL
CO2 SERPL-SCNC: 26 MMOL/L (ref 23–29)
COLOR UR: YELLOW
CREAT SERPL-MCNC: 0.7 MG/DL (ref 0.5–1.4)
DIFFERENTIAL METHOD: ABNORMAL
EOSINOPHIL # BLD AUTO: 0.5 K/UL (ref 0–0.5)
EOSINOPHIL NFR BLD: 5.8 % (ref 0–8)
ERYTHROCYTE [DISTWIDTH] IN BLOOD BY AUTOMATED COUNT: 13.4 % (ref 11.5–14.5)
EST. GFR  (NO RACE VARIABLE): >60 ML/MIN/1.73 M^2
GLUCOSE SERPL-MCNC: 85 MG/DL (ref 70–110)
GLUCOSE UR QL STRIP: NEGATIVE
HCT VFR BLD AUTO: 34.8 % (ref 37–48.5)
HGB BLD-MCNC: 11.4 G/DL (ref 12–16)
HGB UR QL STRIP: NEGATIVE
IMM GRANULOCYTES # BLD AUTO: 0.01 K/UL (ref 0–0.04)
IMM GRANULOCYTES NFR BLD AUTO: 0.1 % (ref 0–0.5)
INR PPP: 1 (ref 0.8–1.2)
KETONES UR QL STRIP: ABNORMAL
LEUKOCYTE ESTERASE UR QL STRIP: ABNORMAL
LYMPHOCYTES # BLD AUTO: 2 K/UL (ref 1–4.8)
LYMPHOCYTES NFR BLD: 25.6 % (ref 18–48)
MCH RBC QN AUTO: 31.1 PG (ref 27–31)
MCHC RBC AUTO-ENTMCNC: 32.8 G/DL (ref 32–36)
MCV RBC AUTO: 95 FL (ref 82–98)
MICROSCOPIC COMMENT: ABNORMAL
MONOCYTES # BLD AUTO: 0.6 K/UL (ref 0.3–1)
MONOCYTES NFR BLD: 7.2 % (ref 4–15)
NEUTROPHILS # BLD AUTO: 4.7 K/UL (ref 1.8–7.7)
NEUTROPHILS NFR BLD: 60.7 % (ref 38–73)
NITRITE UR QL STRIP: POSITIVE
NRBC BLD-RTO: 0 /100 WBC
PH UR STRIP: 7 [PH] (ref 5–8)
PLATELET # BLD AUTO: 227 K/UL (ref 150–450)
PMV BLD AUTO: 10.7 FL (ref 9.2–12.9)
POCT GLUCOSE: 88 MG/DL (ref 70–110)
POTASSIUM SERPL-SCNC: 3.5 MMOL/L (ref 3.5–5.1)
PROT SERPL-MCNC: 7.2 G/DL (ref 6–8.4)
PROT UR QL STRIP: ABNORMAL
PROTHROMBIN TIME: 10.9 SEC (ref 9–12.5)
RBC # BLD AUTO: 3.66 M/UL (ref 4–5.4)
RBC #/AREA URNS HPF: 4 /HPF (ref 0–4)
SODIUM SERPL-SCNC: 142 MMOL/L (ref 136–145)
SP GR UR STRIP: 1.02 (ref 1–1.03)
SQUAMOUS #/AREA URNS HPF: 10 /HPF
TROPONIN I SERPL DL<=0.01 NG/ML-MCNC: 0.01 NG/ML (ref 0–0.03)
URN SPEC COLLECT METH UR: ABNORMAL
UROBILINOGEN UR STRIP-ACNC: NEGATIVE EU/DL
WBC # BLD AUTO: 7.8 K/UL (ref 3.9–12.7)
WBC #/AREA URNS HPF: 10 /HPF (ref 0–5)
WBC CLUMPS URNS QL MICRO: ABNORMAL

## 2023-02-10 PROCEDURE — 96365 THER/PROPH/DIAG IV INF INIT: CPT

## 2023-02-10 PROCEDURE — 93010 EKG 12-LEAD: ICD-10-PCS | Mod: ,,, | Performed by: INTERNAL MEDICINE

## 2023-02-10 PROCEDURE — 99291 CRITICAL CARE FIRST HOUR: CPT | Mod: 25

## 2023-02-10 PROCEDURE — 63600175 PHARM REV CODE 636 W HCPCS: Performed by: EMERGENCY MEDICINE

## 2023-02-10 PROCEDURE — 80053 COMPREHEN METABOLIC PANEL: CPT | Performed by: STUDENT IN AN ORGANIZED HEALTH CARE EDUCATION/TRAINING PROGRAM

## 2023-02-10 PROCEDURE — 85025 COMPLETE CBC W/AUTO DIFF WBC: CPT | Performed by: STUDENT IN AN ORGANIZED HEALTH CARE EDUCATION/TRAINING PROGRAM

## 2023-02-10 PROCEDURE — 84484 ASSAY OF TROPONIN QUANT: CPT | Performed by: STUDENT IN AN ORGANIZED HEALTH CARE EDUCATION/TRAINING PROGRAM

## 2023-02-10 PROCEDURE — 85610 PROTHROMBIN TIME: CPT | Performed by: EMERGENCY MEDICINE

## 2023-02-10 PROCEDURE — 93005 ELECTROCARDIOGRAM TRACING: CPT

## 2023-02-10 PROCEDURE — 81000 URINALYSIS NONAUTO W/SCOPE: CPT | Performed by: STUDENT IN AN ORGANIZED HEALTH CARE EDUCATION/TRAINING PROGRAM

## 2023-02-10 PROCEDURE — 82962 GLUCOSE BLOOD TEST: CPT

## 2023-02-10 PROCEDURE — 93010 ELECTROCARDIOGRAM REPORT: CPT | Mod: ,,, | Performed by: INTERNAL MEDICINE

## 2023-02-10 PROCEDURE — 85730 THROMBOPLASTIN TIME PARTIAL: CPT | Performed by: EMERGENCY MEDICINE

## 2023-02-10 RX ORDER — CEPHALEXIN 500 MG/1
1000 CAPSULE ORAL EVERY 12 HOURS
Qty: 28 CAPSULE | Refills: 0 | Status: SHIPPED | OUTPATIENT
Start: 2023-02-10 | End: 2023-02-17

## 2023-02-10 RX ADMIN — CEFTRIAXONE 1 G: 1 INJECTION, SOLUTION INTRAVENOUS at 08:02

## 2023-02-10 NOTE — ED TRIAGE NOTES
Pt BIB EMS from [facility] after unwitnessed fall. Pt has old bruises on both arms and tenderness of left shoulder and BUE and BLE. No trauma or wounds noted. PMHx dementia, hypercholesteremia, and hypothyroidism. AAOx1, NAD, VSS.

## 2023-02-10 NOTE — ED NOTES
RN discussed discharged medications with daughter Jackie Taylor and JENNA Loving at [facility]. Both verbalized understanding of discharge instructions. RN unable to give detailed instructions to patient due to hx of dementia; will continue to monitor until transportation arrives.

## 2023-02-10 NOTE — ED PROVIDER NOTES
Encounter Date: 2/10/2023    SCRIBE #1 NOTE: I, Silvano Day am scribing for, and in the presence of,  Chapito Collins MD. I have scribed the following portions of the note - Other sections scribed: HPI, ROS, PE.     History     Chief Complaint   Patient presents with    Fall     Patient presents to ED via [facility] secondary to unwitnessed fall. No obvious injuries noted. Family wanted patient sent to ED for evaluation. Patient is calm and cooperative with no complaints. Hx of dementia and is oriented to self which is her baseline. No anticoagulants on med list. Patient is a resident at [facility]. DNR code status     Cristina Taylor is a 89 y.o female with a PMHx of HLD, HTN, anemia, and Hypothyroidism, that presents to the ED via EMS for evaluation of an unwitnessed fall beginning tonight. History provided by an independent historian, EMS, who reports initial call for unwitnessed fall after the patient's family found her on the floor. Patient's daughter, Jackie, reports that the patient's nurse did not put the rail on her bed up last night, and states they found the patient on the floor around 0300. Daughter further notes the patient was alert and oriented upon discovery, and wanted her evaluated. No medications taken PTA. No alleviating or exacerbating factors noted. Denies CP, SOB, nausea, vomiting, or other associated symptoms. Daughter endorses the patient is DNR. Allergic to aspirin, codeine, dicloxacillin, and flexeril.    The history is provided by the EMS personnel and a relative. No  was used.   Review of patient's allergies indicates:   Allergen Reactions    Aspirin Hives    Codeine Hives    Dicloxacillin     Flexeril [cyclobenzaprine]      Past Medical History:   Diagnosis Date    Bursitis     Glaucoma     High cholesterol     Hyperlipemia     Thyroid disease      Past Surgical History:   Procedure Laterality Date    CHOLECYSTECTOMY       No family history on  file.  Social History     Tobacco Use    Smoking status: Never    Smokeless tobacco: Never   Substance Use Topics    Alcohol use: Yes     Alcohol/week: 7.0 standard drinks     Types: 7 Glasses of wine per week    Drug use: No     Review of Systems   Constitutional:  Negative for chills and fever.   HENT:  Negative for congestion and rhinorrhea.    Eyes:  Negative for pain.   Respiratory:  Negative for cough and shortness of breath.    Cardiovascular:  Negative for chest pain and leg swelling.   Gastrointestinal:  Negative for abdominal pain, nausea and vomiting.   Endocrine: Negative for polyuria.   Genitourinary:  Negative for dysuria and hematuria.   Musculoskeletal:  Negative for gait problem and neck pain.   Skin:  Negative for rash.   Allergic/Immunologic: Negative for immunocompromised state.   Neurological:  Negative for weakness and headaches.     Physical Exam     Initial Vitals [02/10/23 0431]   BP Pulse Resp Temp SpO2   136/74 73 16 98.4 °F (36.9 °C) 97 %      MAP       --         Physical Exam    Nursing note and vitals reviewed.  Constitutional: She appears well-developed and well-nourished. She is not diaphoretic. No distress.   HENT:   Head: Normocephalic and atraumatic.   Eyes: Conjunctivae and EOM are normal. Pupils are equal, round, and reactive to light.   Neck: No JVD present.   Normal range of motion.  Cardiovascular:  Normal rate and regular rhythm.           Pulmonary/Chest: Breath sounds normal. No respiratory distress.   Abdominal: Abdomen is soft. Bowel sounds are normal. She exhibits no distension. There is no abdominal tenderness. There is no rebound and no guarding.   Musculoskeletal:         General: No tenderness or edema. Normal range of motion.      Cervical back: Normal range of motion.     Lymphadenopathy:     She has no cervical adenopathy.   Neurological: She is alert.   Skin: Skin is warm. Capillary refill takes less than 2 seconds.   Psychiatric: She has a normal mood and  affect.       ED Course   Critical Care    Date/Time: 2/10/2023 2:19 PM  Performed by: Curly Amaro MD  Authorized by: Curly Amaro MD   Direct patient critical care time: 22 minutes  Additional history critical care time: 11 minutes  Ordering / reviewing critical care time: 11 minutes  Documentation critical care time: 11 minutes  Consulting other physicians critical care time: 11 minutes  Total critical care time (exclusive of procedural time) : 66 minutes  Critical care time was exclusive of separately billable procedures and treating other patients and teaching time.  Critical care was necessary to treat or prevent imminent or life-threatening deterioration of the following conditions: CNS failure or compromise.  Critical care was time spent personally by me on the following activities: development of treatment plan with patient or surrogate, discussions with consultants, evaluation of patient's response to treatment, examination of patient, obtaining history from patient or surrogate, ordering and performing treatments and interventions, ordering and review of laboratory studies, ordering and review of radiographic studies, pulse oximetry, re-evaluation of patient's condition and review of old charts.      Labs Reviewed   CBC W/ AUTO DIFFERENTIAL - Abnormal; Notable for the following components:       Result Value    RBC 3.66 (*)     Hemoglobin 11.4 (*)     Hematocrit 34.8 (*)     MCH 31.1 (*)     All other components within normal limits   URINALYSIS, REFLEX TO URINE CULTURE - Abnormal; Notable for the following components:    Appearance, UA Hazy (*)     Protein, UA Trace (*)     Ketones, UA 2+ (*)     Nitrite, UA Positive (*)     Leukocytes, UA 1+ (*)     All other components within normal limits    Narrative:     Specimen Source->Urine   URINALYSIS MICROSCOPIC - Abnormal; Notable for the following components:    WBC, UA 10 (*)     Bacteria Many (*)     All other components within normal limits     Narrative:     Specimen Source->Urine   COMPREHENSIVE METABOLIC PANEL   TROPONIN I   PROTIME-INR   APTT   POCT GLUCOSE   POCT GLUCOSE MONITORING CONTINUOUS        ECG Results              EKG 12-lead (Final result)  Result time 02/10/23 09:33:05      Final result by Interface, Lab In [facility] (02/10/23 09:33:05)                   Narrative:    Test Reason : W19.XXXA,    Vent. Rate : 068 BPM     Atrial Rate : 068 BPM     P-R Int : 170 ms          QRS Dur : 108 ms      QT Int : 402 ms       P-R-T Axes : 069 085 047 degrees     QTc Int : 427 ms    Normal sinus rhythm  Incomplete right bundle branch block  Borderline Abnormal ECG  When compared with ECG of 21-APR-2016 19:04,  Significant changes have occurred  Confirmed by Leonadro Zhang MD ([id number]) on 2/10/2023 9:32:57 AM    Referred By: System System           Confirmed By:Leonardo Zhang MD                                  Imaging Results              CT Head Without Contrast (Final result)  Result time 02/10/23 13:07:42      Final result by Los Sol MD (02/10/23 13:07:42)                   Impression:      Stable small volume subarachnoid hemorrhage.    No new hemorrhage or major vascular distribution infarct    Pattern of advanced cerebral volume loss with temporal predominance (left more than right).  For clinical correlation.    Mild chronic small vessel ischemic change.      Electronically signed by: Los Sol MD  Date:    02/10/2023  Time:    13:07               Narrative:    EXAMINATION:  CT HEAD WITHOUT CONTRAST    CLINICAL HISTORY:  Subarachnoid hemorrhage (SAH) suspected; Traumatic subarachnoid hemorrhage with loss of consciousness status unknown, initial encounter    TECHNIQUE:  Low dose axial CT images obtained throughout the head without the use of intravenous contrast.  Axial, sagittal and coronal reconstructions were performed.    COMPARISON:  02/10/2023 at 05:29    FINDINGS:  Intracranial compartment:    Stable pattern of cerebral volume  loss, particularly noting asymmetric (left more than right) temporal predominance.  No evidence of hydrocephalus.    Chronic small vessel ischemic changes throughout the supratentorial white matter.  No new parenchymal hemorrhage, edema or major vascular distribution infarct.  No new intracranial mass effect or midline shift.    Stable small volume subarachnoid hemorrhage in the interhemispheric fissure and extending into the paramedian sulci bilaterally.  No new extra-axial blood or fluid collections.    Skull/extracranial contents (limited evaluation):    No displaced calvarial fracture.    The mastoid air cells and visualized paranasal sinuses are essentially clear.    Bilateral pseudophakia.                                       X-Ray Pelvis Complete min 3 views (Final result)  Result time 02/10/23 06:41:49      Final result by Vu Lezama MD (02/10/23 06:41:49)                   Impression:      Noting limitations above, no convincing evidence of acute displaced fracture or dislocation.      Electronically signed by: Vu Lezama  Date:    02/10/2023  Time:    06:41               Narrative:    EXAMINATION:  XR PELVIS COMPLETE MIN 3 VIEWS    CLINICAL HISTORY:  pain;    TECHNIQUE:  Left three views of the pelvis.    COMPARISON:  None    FINDINGS:  Examination limited by suspected osseous demineralization, suboptimal patient positioning, bowel gas and stool, and overlying support apparatus.    Noting this, no convincing evidence of acute displaced fracture or dislocation identified.    Degenerative findings are noted involving the spine, sacroiliac joints, pubic symphysis, and hip joints.    Postoperative sequela project of the visualized portions of the abdomen pelvis.  Phleboliths are seen.  No definite radiopaque foreign body.                                       X-Ray Chest AP Portable (Final result)  Result time 02/10/23 06:15:07      Final result by Vu Lezama MD (02/10/23 06:15:07)                    Impression:      As above.      Electronically signed by: Vu Lezama  Date:    02/10/2023  Time:    06:15               Narrative:    EXAMINATION:  XR CHEST AP PORTABLE    CLINICAL HISTORY:  Unspecified fall, initial encounter    TECHNIQUE:  Single frontal view of the chest was performed.    COMPARISON:  Chest radiograph 05/01/2016.    FINDINGS:  Cardiac monitoring leads overlie the chest.    Cardiac contours grossly within normal limits.  Tortuosity of thoracic aorta, as seen previously on 05/01/2016 radiograph.    Patchy opacities at the left greater than left lung bases could relate to atelectasis, aspiration or pneumonia.    Background interstitial coarsening again noted, as seen previously.    No definite pneumothorax.  Stone trace layering pleural effusions not excluded.    No acute findings in the visualized abdomen.    Question osseous demineralization.  Appears to be scoliotic curvature of the spine.  Mild generalized height loss of several midthoracic vertebra suggested.    Bilateral high riding humeral heads, which may be seen in setting of chronic rotator cuff tears.  Degenerative changes at the AC and glenohumeral joints.                                        CT Head Without Contrast (Final result)  Result time 02/10/23 06:01:01      Final result by Teddy Montalvo MD (02/10/23 06:01:01)                   Impression:      Small volume of acute subarachnoid hemorrhage within the bilateral paramedian sulci along the interhemispheric fissure.  Continued follow-up advised.    Chronic senescent and microvascular ischemic changes.    Preliminary findings were discussed with Dr. Collins by myself at 05:51 on 02/10/2023.    This report was flagged in Epic as abnormal.      Electronically signed by: Teddy Montalvo MD  Date:    02/10/2023  Time:    06:01               Narrative:    EXAMINATION:  CT HEAD WITHOUT CONTRAST    CLINICAL HISTORY:  Unwitnessed fall;    TECHNIQUE:  Low dose axial  images were obtained through the head.  Coronal and sagittal reformations were also performed. Contrast was not administered.    COMPARISON:  CT head 09/19/2020    FINDINGS:  There is a small volume of acute subarachnoid hemorrhage within the bilateral paramedian sulci along the posterior interhemispheric fissure.  Possible additional trace subarachnoid hemorrhage present within the right sylvian fissure.  There is generalized cerebral volume loss with compensatory sulcal widening and ventricular enlargement.  There is patchy and confluent periventricular white matter hypoattenuation suggesting sequelae of chronic microvascular ischemic change.  There is stable encephalomalacia within the left temporal lobe.  No significant midline shift.  The ventricular system appears stable in size and configuration.  There is stable slight asymmetric prominence of the left frontal extra-axial space compared to prior examinations.  The basal cisterns appear patent.  There are patchy secretions within the left sphenoid sinus.  There is trace mucosal thickening of the left maxillary sinus.  There is partial opacification of the left mastoid air cells.  No displaced calvarial fracture identified.                                       CT Cervical Spine Without Contrast (Final result)  Result time 02/10/23 06:10:39      Final result by Vu Lezama MD (02/10/23 06:10:39)                   Impression:      1. Motion compromised examination.  2. Noting this, no convincing evidence of acute cervical spine fracture identified.      Electronically signed by: Vu Lezama  Date:    02/10/2023  Time:    06:10               Narrative:    EXAMINATION:  CT CERVICAL SPINE WITHOUT CONTRAST    CLINICAL HISTORY:  Unwitnessed fall;    TECHNIQUE:  Low dose axial images, sagittal and coronal reformations were performed though the cervical spine.  Contrast was not administered.    COMPARISON:  CT cervical spine 09/19/2020    FINDINGS:  Comment:  Examination limited by motion and suboptimal patient positioning.    Fractures: No acute fractures    Alignment: Assessment alignment limited by patient motion.  Grade 1 anterolisthesis of C5 on C6 and C7 on T1.  Atlanto-axial and atlanto-occipital joints: Atlanto-axial and atlanto-occipital intervals are not widened.  Facet joints: There is no traumatic facet joint widening.Degenerative set arthropathy.  Ankylosis of bilateral C2-3 and left C3-4 facet joints.  Vertebral bodies: Query osseous demineralization.  Degenerative endplate change.  Discs: Degenerative disc disease.  Spinal canal and foraminal narrowing: Although CT does not optimally evaluate the soft tissue contents of the spinal canal and foramina, no critical stenosis is suggested.Degenerative findings appear grossly similar to prior CT imaging performed 09/19/2020.  Paraspinal soft tissues: Unremarkable.    Upper Lungs:Clear    Additional comment: Atherosclerotic calcifications.  Soft tissue attenuation within the external auditory canals would be compatible with cerumen.  Partial opacification of the dependent left mastoid air cells.                                       Medications   cefTRIAXone (ROCEPHIN) 1 g/50 mL D5W IVPB (0 g Intravenous Stopped 2/10/23 0914)     Medical Decision Making:   History:   Old Medical Records: I decided to obtain old medical records.  Initial Assessment:   89 y.o female with a PMHx of HLD, HTN, anemia, and Hypothyroidism, that presents to the ED via EMS for evaluation of an unwitnessed fall beginning tonight.   Patient alert and moving all extremities.  No evidence of head trauma on exam.  Given unwitnessed nature of fall will obtain lab work and EKG to assess for any cause of possible syncopal episode that led to the fall.  Given patient's daughter report of guard rail being down and patient found down on the ground I suspect she rolled off the bed leading to the fall.  Given patient's age and dementia will obtain CT  head and C-spine to assess for any intracranial abnormalities such as a bleed or any osseous abnormalities of the   Cervical spine..  Independently Interpreted Test(s):   I have ordered and independently interpreted EKG Reading(s) - see summary below  Clinical Tests:   Lab Tests: Ordered and Reviewed  Radiological Study: Ordered and Reviewed  Medical Tests: Ordered and Reviewed        Scribe Attestation:   Scribe #1: I performed the above scribed service and the documentation accurately describes the services I performed. I attest to the accuracy of the note.      ED Course as of 02/10/23 1408   Fri Feb 10, 2023   0454   EKG interpretation by me: EKG obtained at 4:51 a.m. normal sinus rhythm with a ventricular rate of 68, erratic baseline on EKG but no STEMI,   Nonspecific T-wave abnormalities. [AS]   0601 CBC without significant leukocytosis, anemia, or platelet abnormalities.  Chem 14 negative for hypo-or hyper natremia, kalemia, chloridemia, or other electrolyte abnormalities; BUN and creatinine were within normal limits indicating normal kidney function, ALT and AST were within normal limits indicating normal liver function.   [AS]   0606 CT Head Without Contrast(!)  Small volume of acute subarachnoid hemorrhage within the bilateral paramedian sulci  [AS]   0641  Spoke with transfer center who recommends speaking with Neurosurgery at [facility] given stability of patient.  Patient care signed out to Dr. Amaro pending neurosurgery consultation and evaluation. [AS]      ED Course User Index  [AS] Chapito Collins MD          This dictation has been generated using M-Modal Fluency Direct dictation; some phonetic errors may occur.     This is doctor Amaro dictating.  I assumed care this patient at 6:00 a.m.  Neuro surgery was consulted, Dr. Laws recommends a repeat CT at the 6 hour jac.  If stable, the patient can be admitted here.  Blood pressure appears to be nicely controlled.  The patient is a DNR.  She has  dementia.  She is not on anticoagulants.  Repeat CT at 6 hours after initial, fails to reveal any significant changes.  I consulted neuro surgery.  Ms. Armijo discussed this case with Dr. Laws.  They do not feel that she needs to be admitted given the fact that the subarachnoid hemorrhage is stable.  We will have her return for any change in mental status.           Clinical Impression:   Final diagnoses:  [W19.XXXA] Fall  [I60.9] SAH (subarachnoid hemorrhage) (Primary)  [S06.6XAA] Subarachnoid hemorrhage following injury              I, Chapito Collins MD, personally performed the services described in this documentation. All medical record entries made by the scribe were at my direction and in my presence. I have reviewed the chart and agree that the record reflects my personal performance and is accurate and complete.         Curly Amaro MD  02/10/23 3183

## 2023-02-10 NOTE — ED NOTES
Received report from KT Pardo. RN introduced self at patient bedside. Pt alert and calm, oriented to self only. Pt has hx of Dementia. Vital signs reassessed and stable. 98% on RA. Pt reports no pain at this time, just mild discomfort. Telemetry maintained. Will proceed with care.      Side rails up x2, bed in lowest position, call light in reach. Room remains free from clutter.

## 2023-07-19 ENCOUNTER — HOSPITAL ENCOUNTER (EMERGENCY)
Facility: HOSPITAL | Age: 88
Discharge: HOME | End: 2023-07-20
Attending: STUDENT IN AN ORGANIZED HEALTH CARE EDUCATION/TRAINING PROGRAM
Payer: OTHER MISCELLANEOUS

## 2023-07-19 DIAGNOSIS — S81.812A LACERATION OF LEFT LEG: ICD-10-CM

## 2023-07-19 PROCEDURE — 25000003 PHARM REV CODE 250: Performed by: STUDENT IN AN ORGANIZED HEALTH CARE EDUCATION/TRAINING PROGRAM

## 2023-07-19 PROCEDURE — 99284 EMERGENCY DEPT VISIT MOD MDM: CPT | Mod: 25

## 2023-07-19 RX ORDER — LIDOCAINE HYDROCHLORIDE AND EPINEPHRINE 10; 10 MG/ML; UG/ML
5 INJECTION, SOLUTION INFILTRATION; PERINEURAL ONCE
Status: COMPLETED | OUTPATIENT
Start: 2023-07-19 | End: 2023-07-19

## 2023-07-19 RX ADMIN — LIDOCAINE HYDROCHLORIDE,EPINEPHRINE BITARTRATE 5 ML: 10; .01 INJECTION, SOLUTION INFILTRATION; PERINEURAL at 11:07

## 2023-07-19 RX ADMIN — Medication 1 ML: at 10:07

## 2023-07-20 VITALS
HEIGHT: 64 IN | BODY MASS INDEX: 18.78 KG/M2 | TEMPERATURE: 98 F | OXYGEN SATURATION: 98 % | RESPIRATION RATE: 16 BRPM | SYSTOLIC BLOOD PRESSURE: 113 MMHG | HEART RATE: 69 BPM | WEIGHT: 110 LBS | DIASTOLIC BLOOD PRESSURE: 52 MMHG

## 2023-07-20 PROCEDURE — 12002 RPR S/N/AX/GEN/TRNK2.6-7.5CM: CPT

## 2023-07-20 RX ORDER — CEPHALEXIN 500 MG/1
500 CAPSULE ORAL EVERY 12 HOURS
Qty: 10 CAPSULE | Refills: 0 | Status: SHIPPED | OUTPATIENT
Start: 2023-07-20 | End: 2023-07-25

## 2023-07-20 NOTE — ED PROVIDER NOTES
Encounter Date: 7/19/2023       History     Chief Complaint   Patient presents with    Laceration     Pt arrived via ems, pt chief complaint is a Laceration. Pt had leg hit on wheel chair by nursing home staff pt has about 3 inch lac to left lower leg per ems.      89-year-old female presents for evaluation of laceration on the left leg sustained prior to arrival.  Injury occurred when she was transitioning from wheelchair to bed.  She can not see due to glaucoma and can not hear very well.  She can not give a clear history of what happened.    Review of patient's allergies indicates:   Allergen Reactions    Aspirin Hives    Codeine Hives    Dicloxacillin     Flexeril [cyclobenzaprine]      Past Medical History:   Diagnosis Date    Bursitis     Glaucoma     High cholesterol     Hyperlipemia     Thyroid disease      Past Surgical History:   Procedure Laterality Date    CHOLECYSTECTOMY       No family history on file.  Social History     Tobacco Use    Smoking status: Never    Smokeless tobacco: Never   Substance Use Topics    Alcohol use: Yes     Alcohol/week: 7.0 standard drinks     Types: 7 Glasses of wine per week    Drug use: No     Review of Systems   Constitutional:  Negative for fever.   HENT:  Negative for sore throat.    Respiratory:  Negative for shortness of breath.    Cardiovascular:  Negative for chest pain.   Gastrointestinal:  Negative for nausea.   Genitourinary:  Negative for dysuria.   Musculoskeletal:  Negative for back pain.   Skin:  Positive for wound. Negative for rash.   Neurological:  Negative for weakness.   Hematological:  Does not bruise/bleed easily.     Physical Exam     Initial Vitals [07/19/23 2211]   BP Pulse Resp Temp SpO2   137/77 75 16 98.5 °F (36.9 °C) 97 %      MAP       --         Physical Exam    Nursing note and vitals reviewed.  Constitutional: She appears well-developed and well-nourished. She is not diaphoretic.   HENT:   Head: Normocephalic and atraumatic.   Mouth/Throat:  Oropharynx is clear and moist.   Eyes: EOM are normal. Pupils are equal, round, and reactive to light. Right eye exhibits no discharge. Left eye exhibits no discharge.   Neck: No tracheal deviation present.   Normal range of motion.  Cardiovascular:  Normal rate, regular rhythm and intact distal pulses.           Pulmonary/Chest: No respiratory distress. She has no wheezes. She exhibits no tenderness.   Abdominal: Abdomen is soft. She exhibits no distension. There is no abdominal tenderness.   Musculoskeletal:         General: No tenderness or edema. Normal range of motion.      Cervical back: Normal range of motion.        Legs:      Neurological: She is alert and oriented to person, place, and time. She has normal strength. No cranial nerve deficit or sensory deficit. GCS eye subscore is 4. GCS verbal subscore is 5. GCS motor subscore is 6.   Skin: Skin is warm and dry. No rash noted.   Psychiatric: She has a normal mood and affect. Her behavior is normal. Thought content normal.       ED Course   Lac Repair    Date/Time: 7/20/2023 12:00 AM  Performed by: Sameer Bloom MD  Authorized by: Sameer Bloom MD     Anesthesia:     Anesthesia method:  Local infiltration    Local anesthetic:  Lidocaine 1% WITH epi  Laceration details:     Location:  Leg    Leg location:  L lower leg    Length (cm):  6    Depth (mm):  10  Treatment:     Area cleansed with:  Saline    Amount of cleaning:  Standard    Irrigation solution:  Sterile saline    Irrigation volume:  500    Irrigation method:  Pressure wash  Skin repair:     Repair method:  Sutures    Suture size:  4-0    Suture material:  Prolene    Suture technique:  Horizontal mattress    Number of sutures:  3  Approximation:     Approximation:  Close  Repair type:     Repair type:  Simple  Post-procedure details:     Dressing:  Open (no dressing)  Labs Reviewed - No data to display       Imaging Results              X-Ray Tibia Fibula 2 View Left (Final result)  Result  time 07/20/23 01:02:51      Final result by Maureen Machado MD (07/20/23 01:02:51)                   Impression:      No acute bony abnormality detected.      Electronically signed by: Maureen Machado  Date:    07/20/2023  Time:    01:02               Narrative:    EXAMINATION:  TWO VIEWS OF THE    CLINICAL HISTORY:  Laceration without foreign body, left lower leg, initial encounter    TECHNIQUE:  AP and lateral views of the left tibia and fibula.    COMPARISON:  None.    FINDINGS:  Two views of the left tibia and fibula demonstrate no acute fracture or dislocation.                                       Medications   LETS (LIDOcaine-TETRAcaine-EPINEPHrine) gel solution (1 mL Topical (Top) Given 7/19/23 6879)   LIDOcaine-EPINEPHrine 1%-1:100,000 injection 5 mL (5 mLs Intradermal Given by Other 7/19/23 3122)                            89-year-old female presents for evaluation of laceration of the lower left leg.  X-ray without evidence of occult fracture or foreign body.  Laceration cleaned and repaired with 3 horizontal mattress sutures through Steri-Strips.  I also placed 1 deep Vicryl suture.  Tdap up-to-date.  Patient given Keflex due to age has risk factor for infection.  Patient is stable for discharge with outpatient follow-up for suture removal and return precautions for worsening symptoms    Clinical Impression:   Final diagnoses:  [S81.812A] Laceration of left leg        ED Disposition Condition    Discharge Stable          ED Prescriptions       Medication Sig Dispense Start Date End Date Auth. Provider    cephALEXin (KEFLEX) 500 MG capsule Take 1 capsule (500 mg total) by mouth every 12 (twelve) hours. for 5 days 10 capsule 7/20/2023 7/25/2023 Sameer Bloom MD          Follow-up Information       Follow up With Specialties Details Why Contact Info    Diego Marks MD Internal Medicine Go in 1 week For wound re-check and removal of 3 horizontal mattress sutures [address]  [address]  [facility] [address]  [phone]               Sameer Bloom MD  07/20/23 0458

## 2023-07-20 NOTE — DISCHARGE INSTRUCTIONS
Have your doctor remove the three horizontal mattress sutures in 1 week. Change dressings daily. Take antibiotics as prescribed.    Thank you for coming to our Emergency Department today. It is important to remember that some problems or medical conditions are difficult to diagnose and may not be found during your Emergency Department visit.     Be sure to follow up with your primary care doctor and review all labs/imaging/tests that were performed during your ER visit with them. Some labs/tests may be outside of the normal range and require non-emergent follow-up and further investigation to help diagnose/exclude/prevent complications or other potentially serious medical conditions that were not addressed during your ER visit.    If you do not have a primary care doctor, you may contact the one listed on your discharge paperwork or you may also call the [facility] Appointment Desk at [phone] to schedule an appointment and establish care with one. Another resources for finding primary care physicians: [email or web address] It is important to your health that you have a primary care doctor.    Please take all medications as directed. All medications may potentially have side-effects and it is impossible to predict which medications may give you side-effects or what side-effects (if any) they will give you. If you feel that you are having a negative effect or side-effect of any medication you should immediately stop taking them and seek medical attention. If you feel that you are having a life-threatening reaction call 911.    Return to the ER with any questions/concerns, new/concerning symptoms, worsening or failure to improve.     Do not drive, swim, climb to height, take a bath, operate heavy machinery, drink alcohol or take potentially sedating medications, sign any legal documents or make any important decisions for 24 hours if you have received any pain medications, sedatives or mood altering drugs  during your ER visit or within 24 hours of taking them if they have been prescribed to you.     You can find additional resources for Dentists, hearing aids, durable medical equipment, low cost pharmacies and other resources at [email or web address]

## 2023-07-20 NOTE — ED NOTES
Report called to Nestor Jackson was given discharge /instructions for follow up. Pending transport to facility.

## 2023-07-20 NOTE — ED NOTES
Pt to EMS stretcher via draw sheet. Pt tolerated well. Report given to EMS personnel. Pt back to assisted living facility. NAD

## 2025-06-11 NOTE — ED TRIAGE NOTES
Patient presents to ED via EMS after fall at [facility]. Per EMS patient was transferring from wheelchair to bed and fell sustain a cut to her LLE. Patient is disoriented x4 which is her baseline however she does endorse pain to her leg.   
mother/Parent